# Patient Record
Sex: FEMALE | Race: WHITE | Employment: UNEMPLOYED | URBAN - METROPOLITAN AREA
[De-identification: names, ages, dates, MRNs, and addresses within clinical notes are randomized per-mention and may not be internally consistent; named-entity substitution may affect disease eponyms.]

---

## 2018-12-01 ENCOUNTER — HOSPITAL ENCOUNTER (OUTPATIENT)
Age: 28
Discharge: HOME | End: 2018-12-03
Payer: MEDICAID

## 2018-12-01 ENCOUNTER — HOSPITAL ENCOUNTER (OUTPATIENT)
Dept: GENERAL RADIOLOGY | Age: 28
Discharge: HOME | End: 2018-12-03
Payer: MEDICAID

## 2018-12-01 DIAGNOSIS — R52 PAIN: ICD-10-CM

## 2018-12-01 DIAGNOSIS — M79.641 RIGHT HAND PAIN: ICD-10-CM

## 2018-12-01 DIAGNOSIS — M25.531 RIGHT WRIST PAIN: ICD-10-CM

## 2018-12-01 PROCEDURE — 73130 X-RAY EXAM OF HAND: CPT

## 2019-05-01 ENCOUNTER — OFFICE VISIT (OUTPATIENT)
Dept: NEUROLOGY | Age: 29
End: 2019-05-01
Payer: MEDICAID

## 2019-05-01 ENCOUNTER — TELEPHONE (OUTPATIENT)
Dept: NEUROLOGY | Age: 29
End: 2019-05-01

## 2019-05-01 VITALS
BODY MASS INDEX: 28.43 KG/M2 | HEART RATE: 95 BPM | HEIGHT: 66 IN | OXYGEN SATURATION: 97 % | RESPIRATION RATE: 18 BRPM | DIASTOLIC BLOOD PRESSURE: 81 MMHG | WEIGHT: 176.9 LBS | TEMPERATURE: 98.2 F | SYSTOLIC BLOOD PRESSURE: 112 MMHG

## 2019-05-01 DIAGNOSIS — G40.309 GENERALIZED SEIZURE DISORDER (HCC): Primary | ICD-10-CM

## 2019-05-01 PROCEDURE — G8419 CALC BMI OUT NRM PARAM NOF/U: HCPCS | Performed by: CLINICAL NURSE SPECIALIST

## 2019-05-01 PROCEDURE — 99204 OFFICE O/P NEW MOD 45 MIN: CPT | Performed by: CLINICAL NURSE SPECIALIST

## 2019-05-01 PROCEDURE — 1036F TOBACCO NON-USER: CPT | Performed by: CLINICAL NURSE SPECIALIST

## 2019-05-01 PROCEDURE — G8427 DOCREV CUR MEDS BY ELIG CLIN: HCPCS | Performed by: CLINICAL NURSE SPECIALIST

## 2019-05-01 RX ORDER — LAMOTRIGINE 200 MG/1
200 TABLET, EXTENDED RELEASE ORAL DAILY
Qty: 30 TABLET | Refills: 11 | Status: SHIPPED | OUTPATIENT
Start: 2019-05-01 | End: 2019-11-04 | Stop reason: SDUPTHER

## 2019-05-01 NOTE — TELEPHONE ENCOUNTER
[facility] approved Lamictal [id number], valid 7/9/2019 - 7/9/2019.   In Media  Electronically signed by Dany Gamboa on 5/1/19 at 3:32 PM

## 2019-05-01 NOTE — PROGRESS NOTES
years - not missing her medication    She follows with a PCP and had labs recently which were reportedly unrevealing     She was following with Dr Diane Perez, then Dr Bennie Brewster, then Dr Flavio Dockery and then Dr Abeba Abel until his custodial. When she has a seizure, she gets weak all over and then displays GTC movements for upwards of 1 hour -- she is post ictal for 24 hours before she starts acting herself. She is here alone but is an excellent historian.        Objective:   /81 (Site: Right Upper Arm, Position: Sitting, Cuff Size: Medium Adult)   Pulse 95   Temp 98.2 °F (36.8 °C) (Oral)   Resp 18   Ht 5' 5.5\" (1.664 m)   Wt 176 lb 14.4 oz (80.2 kg)   SpO2 97%   BMI 28.99 kg/m²      General appearance: alert, appears stated age and cooperative  Head: Normocephalic, without obvious abnormality, atraumatic  Neck: no adenopathy, no carotid bruit and limited ROM  Lungs: clear to auscultation bilaterally  Heart: regular rate and rhythm  Extremities: no cyanosis or edema  Pulses: 2+ and symmetric  Skin: no rashes or lesions     Mental Status: Alert, oriented to person place and year     Speech: clear  Language: appropriate     Cranial Nerves:  I: smell    II: visual acuity     II: visual fields Full    II: pupils PEPE   III,VII: ptosis None   III,IV,VI: extraocular muscles  EOMI without nystagmus    V: mastication Normal   V: facial light touch sensation  Normal   V,VII: corneal reflex  Present   VII: facial muscle function - upper     VII: facial muscle function - lower Normal   VIII: hearing Normal   IX: soft palate elevation  Normal   IX,X: gag reflex Present   XI: trapezius strength  5/5   XI: sternocleidomastoid strength 5/5   XI: neck extension strength  5/5   XII: tongue strength  Normal     Motor:  5/5 throughout  Normal bulk and tone     Sensory:  LT and PP normal  Vibration normal     Coordination:   FN, FFM and ESETFANY normal  HS normal    Gait:  Normal     DTR:   Right Brachioradialis reflex 1+  Left Brachioradialis reflex 1+  Right Biceps reflex 1+  Left Biceps reflex 1+  Right Quadriceps reflex 2+  Left Quadriceps reflex 2+  Right Achilles reflex 2+  Left Achilles reflex 2+    No Panchal's     Laboratory/Radiology:     Labs per PCP -- done last month -- reportedly normal but none to personally review today - will request    MRI Brain 2017  Normal     I independently reviewed the 2017 imaging studies at today's appointment. 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

## 2019-11-04 ENCOUNTER — OFFICE VISIT (OUTPATIENT)
Dept: NEUROLOGY | Age: 29
End: 2019-11-04
Payer: MEDICAID

## 2019-11-04 VITALS
HEART RATE: 100 BPM | BODY MASS INDEX: 29.32 KG/M2 | SYSTOLIC BLOOD PRESSURE: 116 MMHG | OXYGEN SATURATION: 99 % | WEIGHT: 176 LBS | HEIGHT: 65 IN | RESPIRATION RATE: 20 BRPM | DIASTOLIC BLOOD PRESSURE: 79 MMHG

## 2019-11-04 DIAGNOSIS — G40.309 GENERALIZED SEIZURE DISORDER (HCC): Primary | ICD-10-CM

## 2019-11-04 PROCEDURE — 99214 OFFICE O/P EST MOD 30 MIN: CPT | Performed by: CLINICAL NURSE SPECIALIST

## 2019-11-04 PROCEDURE — G8419 CALC BMI OUT NRM PARAM NOF/U: HCPCS | Performed by: CLINICAL NURSE SPECIALIST

## 2019-11-04 PROCEDURE — 1036F TOBACCO NON-USER: CPT | Performed by: CLINICAL NURSE SPECIALIST

## 2019-11-04 PROCEDURE — G8427 DOCREV CUR MEDS BY ELIG CLIN: HCPCS | Performed by: CLINICAL NURSE SPECIALIST

## 2019-11-04 PROCEDURE — G8484 FLU IMMUNIZE NO ADMIN: HCPCS | Performed by: CLINICAL NURSE SPECIALIST

## 2019-11-04 RX ORDER — LAMOTRIGINE 200 MG/1
200 TABLET, EXTENDED RELEASE ORAL DAILY
Qty: 30 TABLET | Refills: 11 | Status: SHIPPED
Start: 2019-11-04 | End: 2020-10-26 | Stop reason: SDUPTHER

## 2020-10-26 RX ORDER — LAMOTRIGINE 200 MG/1
200 TABLET, EXTENDED RELEASE ORAL DAILY
Qty: 30 TABLET | Refills: 11 | Status: SHIPPED
Start: 2020-10-26 | End: 2021-11-08

## 2021-11-08 RX ORDER — LAMOTRIGINE 200 MG/1
200 TABLET, EXTENDED RELEASE ORAL DAILY
Qty: 30 TABLET | Refills: 11 | Status: SHIPPED
Start: 2021-11-08 | End: 2021-11-10 | Stop reason: SDUPTHER

## 2021-11-10 ENCOUNTER — OFFICE VISIT (OUTPATIENT)
Dept: NEUROLOGY | Age: 31
End: 2021-11-10
Payer: MEDICAID

## 2021-11-10 VITALS
DIASTOLIC BLOOD PRESSURE: 80 MMHG | OXYGEN SATURATION: 99 % | WEIGHT: 176 LBS | SYSTOLIC BLOOD PRESSURE: 124 MMHG | TEMPERATURE: 97.7 F | BODY MASS INDEX: 29.32 KG/M2 | HEIGHT: 65 IN | HEART RATE: 107 BPM | RESPIRATION RATE: 20 BRPM

## 2021-11-10 DIAGNOSIS — G40.909 SEIZURE DISORDER (HCC): Primary | ICD-10-CM

## 2021-11-10 PROCEDURE — 99213 OFFICE O/P EST LOW 20 MIN: CPT | Performed by: CLINICAL NURSE SPECIALIST

## 2021-11-10 PROCEDURE — 1036F TOBACCO NON-USER: CPT | Performed by: CLINICAL NURSE SPECIALIST

## 2021-11-10 PROCEDURE — G8419 CALC BMI OUT NRM PARAM NOF/U: HCPCS | Performed by: CLINICAL NURSE SPECIALIST

## 2021-11-10 PROCEDURE — G8427 DOCREV CUR MEDS BY ELIG CLIN: HCPCS | Performed by: CLINICAL NURSE SPECIALIST

## 2021-11-10 PROCEDURE — G8484 FLU IMMUNIZE NO ADMIN: HCPCS | Performed by: CLINICAL NURSE SPECIALIST

## 2021-11-10 RX ORDER — LAMOTRIGINE 200 MG/1
200 TABLET, EXTENDED RELEASE ORAL DAILY
Qty: 30 TABLET | Refills: 11 | Status: SHIPPED | OUTPATIENT
Start: 2021-11-10

## 2021-11-10 NOTE — PROGRESS NOTES
Tiffany Kirkpatrick is a 27 y.o. right handed woman    Patient was diagnosed with seizures 3 days after she was born. She states she has a strong family history of seizures with her father and grandmother. Her sister also has seizures. Patient does not have any children. Growing up, she was followed by [facility] and tried on numerous AEDs including but not limited to    Dilantin  Phenobarbital  Topamax  Gabapentin  Lyrica  Keppra    And now currently Lamictal XR 200mg daily    She has been seizure free for 11 years - not missing her medication    She follows with a PCP and had any recent labs    She was following with Dr Francine Menendez, then Dr Deanna Mei, then Dr Leland Mortensen and then Dr Kristi Chino until his group home. When she has a seizure, she gets weak all over and then displays GTC movements for upwards of 1 hour -- she is post ictal for 24 hours before she starts acting herself. She is here alone but is an excellent historian.      Allergies as of 11/10/2021    (No Known Allergies)     Objective:   /80 (Site: Right Upper Arm, Position: Sitting, Cuff Size: Medium Adult)   Pulse 107   Temp 97.7 °F (36.5 °C)   Resp 20   Ht 5' 5\" (1.651 m)   Wt 176 lb (79.8 kg)   SpO2 99%   BMI 29.29 kg/m²      General appearance: alert, appears stated age and cooperative  Head: Normocephalic, without obvious abnormality, atraumatic  Extremities: no cyanosis or edema  Pulses: 2+ and symmetric  Skin: no rashes or lesions     Mental Status: Alert, oriented to person place and year     Speech: clear  Language: appropriate     Cranial Nerves:  I: smell    II: visual acuity     II: visual fields Full    II: pupils PEPE   III,VII: ptosis None   III,IV,VI: extraocular muscles  EOMI without nystagmus    V: mastication Normal   V: facial light touch sensation  Normal   V,VII: corneal reflex  Present   VII: facial muscle function - upper     VII: facial muscle function - lower Normal   VIII: hearing Normal   IX: soft palate elevation  Normal   IX,X: gag reflex    XI: trapezius strength  5/5   XI: sternocleidomastoid strength 5/5   XI: neck extension strength  5/5   XII: tongue strength  Normal     Motor:  5/5 throughout  Normal bulk and tone     Sensory:  LT and PP normal  Vibration normal     Coordination:   FN, FFM and ESTEFANY normal    Gait:  Normal     DTR:   Right Brachioradialis reflex 1+  Left Brachioradialis reflex 1+  Right Biceps reflex 1+  Left Biceps reflex 1+  Right Quadriceps reflex 2+  Left Quadriceps reflex 2+  Right Achilles reflex 2+  Left Achilles reflex 2+    No Panchal's     Laboratory/Radiology:     Labs per PCP --  reportedly normal but none to personally review today    MRI Brain 2017  Normal     I independently reviewed the 2017 imaging studies at today's appointment.      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

## 2021-11-19 DIAGNOSIS — G40.909 SEIZURE DISORDER (HCC): ICD-10-CM

## 2023-01-17 ENCOUNTER — OFFICE VISIT (OUTPATIENT)
Dept: NEUROLOGY | Age: 33
End: 2023-01-17
Payer: MEDICAID

## 2023-01-17 VITALS
TEMPERATURE: 98 F | WEIGHT: 176 LBS | SYSTOLIC BLOOD PRESSURE: 115 MMHG | BODY MASS INDEX: 29.29 KG/M2 | OXYGEN SATURATION: 100 % | DIASTOLIC BLOOD PRESSURE: 75 MMHG | HEART RATE: 103 BPM

## 2023-01-17 DIAGNOSIS — G40.909 SEIZURE DISORDER (HCC): Primary | ICD-10-CM

## 2023-01-17 PROCEDURE — 1036F TOBACCO NON-USER: CPT | Performed by: CLINICAL NURSE SPECIALIST

## 2023-01-17 PROCEDURE — G8419 CALC BMI OUT NRM PARAM NOF/U: HCPCS | Performed by: CLINICAL NURSE SPECIALIST

## 2023-01-17 PROCEDURE — 99213 OFFICE O/P EST LOW 20 MIN: CPT | Performed by: CLINICAL NURSE SPECIALIST

## 2023-01-17 PROCEDURE — G8427 DOCREV CUR MEDS BY ELIG CLIN: HCPCS | Performed by: CLINICAL NURSE SPECIALIST

## 2023-01-17 PROCEDURE — G8484 FLU IMMUNIZE NO ADMIN: HCPCS | Performed by: CLINICAL NURSE SPECIALIST

## 2023-01-17 RX ORDER — LAMOTRIGINE 200 MG/1
200 TABLET, EXTENDED RELEASE ORAL DAILY
Qty: 30 TABLET | Refills: 11 | Status: SHIPPED | OUTPATIENT
Start: 2023-01-17

## 2023-01-17 NOTE — PROGRESS NOTES
Minerva Gloria is a 34 y.o. right handed woman    Patient was diagnosed with seizures 3 days after she was born. She states she has a strong family history of seizures with her father and grandmother. Her sister also has seizures. Patient does not have any children. Growing up, she was followed by [facility] and tried on numerous AEDs including but not limited to    Dilantin  Phenobarbital  Topamax  Gabapentin  Lyrica  Keppra    And now currently Lamictal XR 200mg daily    She has been seizure free for 11 years - not missing her medication    She follows with a PCP and had any recent labs    She was following with Dr Magali Beebe, then Dr Liliana Mojica, then Dr Cristiano Yeh and then Dr Jazzy Alexandra until his [facility]. When she has a seizure, she gets weak all over and then displays GTC movements for upwards of 1 hour -- she is post ictal for 24 hours before she starts acting herself.     Allergies as of 01/17/2023    (No Known Allergies)     Objective:   /75 (Site: Right Upper Arm, Position: Sitting)   Pulse (!) 103   Temp 98 °F (36.7 °C)   Wt 176 lb (79.8 kg)   SpO2 100%   BMI 29.29 kg/m²      General appearance: alert, appears stated age and cooperative  Head: Normocephalic, without obvious abnormality, atraumatic  Extremities: no cyanosis or edema  Pulses: 2+ and symmetric  Skin: no rashes or lesions     Mental Status: Alert, oriented to person place and year     Speech: clear  Language: appropriate     Cranial Nerves:  I: smell    II: visual acuity     II: visual fields Full    II: pupils PEPE   III,VII: ptosis None   III,IV,VI: extraocular muscles  EOMI without nystagmus    V: mastication Normal   V: facial light touch sensation  Normal   V,VII: corneal reflex  Present   VII: facial muscle function - upper     VII: facial muscle function - lower Normal   VIII: hearing Normal   IX: soft palate elevation  Normal   IX,X: gag reflex    XI: trapezius strength  5/5   XI: sternocleidomastoid strength 5/5   XI: neck extension strength 5/5   XII: tongue strength  Normal     Motor:  5/5 throughout  Normal bulk and tone     Sensory:  LT and PP normal  Vibration normal     Coordination:   FN, FFM and ESTEFANY normal    Gait:  Normal     DTR:   Right Brachioradialis reflex 1+  Left Brachioradialis reflex 1+  Right Biceps reflex 1+  Left Biceps reflex 1+  Right Quadriceps reflex 2+  Left Quadriceps reflex 2+  Right Achilles reflex 2+  Left Achilles reflex 2+    No Panchal's     Laboratory/Radiology:     Labs per PCP --  reportedly normal but none to personally review today    MRI Brain 2017  Normal     I independently reviewed the 2017 imaging studies at today's appointment.      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

## 2023-04-05 LAB

## 2023-04-11 DIAGNOSIS — G40.909 SEIZURE DISORDER (HCC): ICD-10-CM

## 2023-08-09 ENCOUNTER — HOSPITAL ENCOUNTER (OUTPATIENT)
Dept: ULTRASOUND IMAGING | Age: 33
Discharge: HOME | End: 2023-08-09
Payer: MEDICAID

## 2023-08-09 DIAGNOSIS — N92.0 MENORRHAGIA WITH REGULAR CYCLE: ICD-10-CM

## 2023-08-09 PROCEDURE — 76830 TRANSVAGINAL US NON-OB: CPT

## 2025-05-21 ENCOUNTER — OFFICE VISIT (OUTPATIENT)
Age: 35
End: 2025-05-21
Payer: MEDICAID

## 2025-05-21 VITALS
BODY MASS INDEX: 25.33 KG/M2 | TEMPERATURE: 98.2 F | DIASTOLIC BLOOD PRESSURE: 84 MMHG | HEIGHT: 65 IN | WEIGHT: 152 LBS | SYSTOLIC BLOOD PRESSURE: 122 MMHG | OXYGEN SATURATION: 98 %

## 2025-05-21 DIAGNOSIS — G40.909 SEIZURE DISORDER (HCC): Primary | ICD-10-CM

## 2025-05-21 PROCEDURE — G8427 DOCREV CUR MEDS BY ELIG CLIN: HCPCS | Performed by: CLINICAL NURSE SPECIALIST

## 2025-05-21 PROCEDURE — 99214 OFFICE O/P EST MOD 30 MIN: CPT | Performed by: CLINICAL NURSE SPECIALIST

## 2025-05-21 PROCEDURE — 99213 OFFICE O/P EST LOW 20 MIN: CPT | Performed by: CLINICAL NURSE SPECIALIST

## 2025-05-21 PROCEDURE — 1036F TOBACCO NON-USER: CPT | Performed by: CLINICAL NURSE SPECIALIST

## 2025-05-21 PROCEDURE — G8419 CALC BMI OUT NRM PARAM NOF/U: HCPCS | Performed by: CLINICAL NURSE SPECIALIST

## 2025-05-21 RX ORDER — TRAZODONE HYDROCHLORIDE 50 MG/1
50 TABLET ORAL NIGHTLY
Qty: 30 TABLET | Refills: 1 | Status: SHIPPED | OUTPATIENT
Start: 2025-05-21

## 2025-05-21 RX ORDER — LAMOTRIGINE 200 MG/1
200 TABLET, EXTENDED RELEASE ORAL DAILY
Qty: 90 TABLET | Refills: 3 | Status: SHIPPED | OUTPATIENT
Start: 2025-05-21

## 2025-05-21 NOTE — PROGRESS NOTES
Snadi Roman is a 34 y.o. right handed woman    Patient was diagnosed with seizures 3 days after she was born.    She states she has a strong family history of seizures with her father and grandmother. Her sister also has seizures. Patient does not have any children.  Growing up, she was followed by [facility] and tried on numerous AEDs including but not limited to    Dilantin  Phenobarbital  Topamax  Gabapentin  Lyrica  Keppra    And now currently Lamictal XR 200mg daily    She has been seizure free for many many years - not missing her medication    She follows with a PCP and had recent labs -following with her regularly due to recent diagnosis of endometriosis    She was following with Dr Marcos, then Dr Hyman, then Dr Bocanegra and then Dr Liang until his nursing home.    When she has a seizure, she gets weak all over and then displays GTC movements for upwards of 1 hour -- she is post ictal for 24 hours before she starts acting herself.    Not sleeping well since the passing of her mother last year   Discussed anxiety treatment options    Allergies as of 05/21/2025 - Fully Reviewed 05/21/2025   Allergen Reaction Noted    Bee venom  12/14/2023     Objective:   /84 (BP Site: Right Upper Arm, Patient Position: Sitting, BP Cuff Size: Large Adult)   Temp 98.2 °F (36.8 °C) (Temporal)   Ht 1.651 m (5' 5\")   Wt 68.9 kg (152 lb)   SpO2 98%   BMI 25.29 kg/m²      General appearance: alert, appears stated age and cooperative  Head: Normocephalic, without obvious abnormality, atraumatic  Extremities: no cyanosis or edema  Pulses: 2+ and symmetric  Skin: no rashes or lesions     Mental Status: Alert, oriented to person place and year     Speech: clear  Language: appropriate     Cranial Nerves:  I: smell    II: visual acuity     II: visual fields Full    II: pupils PEPE   III,VII: ptosis None   III,IV,VI: extraocular muscles  EOMI without nystagmus    V: mastication Normal   V: facial light touch sensation  Normal   V,VII:

## 2025-05-29 ENCOUNTER — PROCEDURE VISIT (OUTPATIENT)
Dept: AUDIOLOGY | Age: 35
End: 2025-05-29
Payer: MEDICAID

## 2025-05-29 ENCOUNTER — OFFICE VISIT (OUTPATIENT)
Dept: ENT CLINIC | Age: 35
End: 2025-05-29
Payer: MEDICAID

## 2025-05-29 VITALS
TEMPERATURE: 97.6 F | BODY MASS INDEX: 26.49 KG/M2 | RESPIRATION RATE: 14 BRPM | WEIGHT: 159 LBS | DIASTOLIC BLOOD PRESSURE: 74 MMHG | OXYGEN SATURATION: 97 % | HEART RATE: 75 BPM | SYSTOLIC BLOOD PRESSURE: 106 MMHG | HEIGHT: 65 IN

## 2025-05-29 DIAGNOSIS — Z01.10 NORMAL HEARING EXAM: ICD-10-CM

## 2025-05-29 DIAGNOSIS — H92.02 LEFT EAR PAIN: ICD-10-CM

## 2025-05-29 DIAGNOSIS — H92.02 OTALGIA, LEFT: Primary | ICD-10-CM

## 2025-05-29 DIAGNOSIS — H93.8X2 PRESSURE SENSATION IN LEFT EAR: Primary | ICD-10-CM

## 2025-05-29 PROCEDURE — 99203 OFFICE O/P NEW LOW 30 MIN: CPT | Performed by: NURSE PRACTITIONER

## 2025-05-29 PROCEDURE — 1036F TOBACCO NON-USER: CPT | Performed by: NURSE PRACTITIONER

## 2025-05-29 PROCEDURE — G8427 DOCREV CUR MEDS BY ELIG CLIN: HCPCS | Performed by: NURSE PRACTITIONER

## 2025-05-29 PROCEDURE — 92567 TYMPANOMETRY: CPT | Performed by: AUDIOLOGIST

## 2025-05-29 PROCEDURE — 92557 COMPREHENSIVE HEARING TEST: CPT | Performed by: AUDIOLOGIST

## 2025-05-29 PROCEDURE — G8419 CALC BMI OUT NRM PARAM NOF/U: HCPCS | Performed by: NURSE PRACTITIONER

## 2025-05-29 RX ORDER — FLUTICASONE PROPIONATE 50 MCG
2 SPRAY, SUSPENSION (ML) NASAL DAILY
Qty: 1 EACH | Refills: 2 | Status: SHIPPED | OUTPATIENT
Start: 2025-05-29 | End: 2025-06-28

## 2025-05-29 ASSESSMENT — ENCOUNTER SYMPTOMS
SHORTNESS OF BREATH: 0
SINUS PAIN: 0
SINUS PRESSURE: 0
STRIDOR: 0
RHINORRHEA: 0
RESPIRATORY NEGATIVE: 1
SORE THROAT: 0
EYES NEGATIVE: 1

## 2025-05-29 NOTE — PROGRESS NOTES
Dr. Brown Breaux  [facility] CNP    Patient Name:  Sandi Roman  :  1990     CHIEF C/O:    Chief Complaint   Patient presents with    Ear Problem     Pt states she has pressure in her left ear, for about 2 years        HISTORY OBTAINED FROM:  patient    HISTORY OF PRESENT ILLNESS:       Sandi is a 34 y.o. year old female, here today for ongoing pressure in the left ear. The pain is in front of the left ear. It is not constant, and it only happens once in a while. It usually happens once or twice a week. Any noise usually makes it worse. No pain with eating, drinking, or chewing. It is sound sensitivity. No hearing changes. She gets swimmer's ear easily. Unsure about drainage. No h/o loud noise exposure. No h/o smoking. She has allergies to grass. She does not currently take anything for it. Sometimes the ear will pop. Today, the ear feels a little irritated. No h/o wax build up. No chronic sinus infections.       Past Medical History:   Diagnosis Date    Hypoglycemia     Seizures (HCC)      Past Surgical History:   Procedure Laterality Date    DENTAL SURGERY      KNEE SURGERY         Current Outpatient Medications:     fluticasone (FLONASE) 50 MCG/ACT nasal spray, 2 sprays by Nasal route daily 2 sprays each nostril once daily, Disp: 1 each, Rfl: 2    traZODone (DESYREL) 50 MG tablet, Take 1 tablet by mouth nightly, Disp: 30 tablet, Rfl: 1    lamoTRIgine (LAMICTAL XR) 200 MG TB24 extended release tablet, Take 1 tablet by mouth daily, Disp: 90 tablet, Rfl: 3    ferrous gluconate (FERGON) 324 (38 Fe) MG tablet, Take 1 tablet by mouth 2 times daily, Disp: , Rfl:     furosemide (LASIX) 20 MG tablet, Take 1 tablet by mouth, Disp: , Rfl:     EPINEPHrine HCl, Anaphylaxis, (EPIPEN IM), Inject into the muscle, Disp: , Rfl:   Bee venom  Social History     Tobacco Use    Smoking status: Never    Smokeless tobacco: Never   Substance Use Topics    Alcohol use: No

## 2025-05-29 NOTE — PROGRESS NOTES
This patient was referred for audiometric and tympanometric testing by JESI Pineda due to ear pressure/pain, left ear.  Patient reported a decrease in hearing sensitivity, left ear.  Patient denied tinnitus and vertigo, at this time..     Audiometry using pure tone air and bone conduction testing revealed normal hearing sensitivity, through the frequency range,    hearing loss, bilaterally. Reliability was good. Speech reception thresholds were in good agreement with the pure tone averages, bilaterally.  Speech discrimination scores were 100%, bilaterally at 45dBHL.    Tympanometry revealed normal middle ear peak pressure and compliance, bilaterally.    The results were reviewed with the patient and ordering provider.     Recommendations for follow up will be made pending ordering provider consult.    Jayson Nieves CCC/CELINA  Audiologist  [id number]  NPI#:  [id number]      Electronically signed by Kathrine Isaac on 5/29/2025 at 8:52 AM

## 2025-08-07 ENCOUNTER — OFFICE VISIT (OUTPATIENT)
Age: 35
End: 2025-08-07
Payer: MEDICAID

## 2025-08-07 VITALS
DIASTOLIC BLOOD PRESSURE: 76 MMHG | OXYGEN SATURATION: 98 % | HEART RATE: 72 BPM | RESPIRATION RATE: 16 BRPM | BODY MASS INDEX: 25.33 KG/M2 | HEIGHT: 65 IN | SYSTOLIC BLOOD PRESSURE: 113 MMHG | WEIGHT: 152 LBS

## 2025-08-07 DIAGNOSIS — G40.909 SEIZURE DISORDER (HCC): Primary | ICD-10-CM

## 2025-08-07 PROCEDURE — 99213 OFFICE O/P EST LOW 20 MIN: CPT | Performed by: CLINICAL NURSE SPECIALIST

## 2025-08-07 RX ORDER — LAMOTRIGINE 200 MG/1
200 TABLET, EXTENDED RELEASE ORAL DAILY
Qty: 90 TABLET | Refills: 3 | Status: SHIPPED | OUTPATIENT
Start: 2025-08-07

## 2025-08-07 RX ORDER — TRAZODONE HYDROCHLORIDE 50 MG/1
50 TABLET ORAL NIGHTLY
Qty: 90 TABLET | Refills: 3 | Status: SHIPPED | OUTPATIENT
Start: 2025-08-07